# Patient Record
(demographics unavailable — no encounter records)

---

## 2025-06-18 NOTE — PLAN
[FreeTextEntry1] : Plan - MRI Brain - Schedule a telehealth appointment in the middle of the summer to assess the situation further and make decisions based on the MRI results and any changes in symptoms. - If symptoms worsen, contact our office to discuss the possibility of starting a vitamin treatment, which may be beneficial if migraines are suspected.

## 2025-06-18 NOTE — PHYSICAL EXAM
[Well-appearing] : well-appearing [Normocephalic] : normocephalic [No dysmorphic facial features] : no dysmorphic facial features [No ocular abnormalities] : no ocular abnormalities [Neck supple] : neck supple [Straight] : straight [No bhupinder or dimples] : no bhupinder or dimples [No deformities] : no deformities [Alert] : alert [Well related, good eye contact] : well related, good eye contact [Conversant] : conversant [Normal speech and language] : normal speech and language [Follows instructions well] : follows instructions well [VFF] : VFF [Pupils reactive to light and accommodation] : pupils reactive to light and accommodation [Full extraocular movements] : full extraocular movements [No nystagmus] : no nystagmus [No papilledema] : no papilledema [Normal facial sensation to light touch] : normal facial sensation to light touch [No facial asymmetry or weakness] : no facial asymmetry or weakness [Gross hearing intact] : gross hearing intact [Equal palate elevation] : equal palate elevation [Good shoulder shrug] : good shoulder shrug [Normal tongue movement] : normal tongue movement [Midline tongue, no fasciculations] : midline tongue, no fasciculations [R handed] : R handed [Normal axial and appendicular muscle tone] : normal axial and appendicular muscle tone [Gets up on table without difficulty] : gets up on table without difficulty [No pronator drift] : no pronator drift [Normal finger tapping and fine finger movements] : normal finger tapping and fine finger movements [No abnormal involuntary movements] : no abnormal involuntary movements [5/5 strength in proximal and distal muscles of arms and legs] : 5/5 strength in proximal and distal muscles of arms and legs [Walks and runs well] : walks and runs well [Able to do deep knee bend] : able to do deep knee bend [Able to walk on heels] : able to walk on heels [Able to walk on toes] : able to walk on toes [2+ biceps] : 2+ biceps [Triceps] : triceps [Knee jerks] : knee jerks [Ankle jerks] : ankle jerks [No ankle clonus] : no ankle clonus [Localizes LT and temperature] : localizes LT and temperature [No dysmetria on FTNT] : no dysmetria on FTNT [Good walking balance] : good walking balance [Normal gait] : normal gait [Able to tandem well] : able to tandem well [Negative Romberg] : negative Romberg
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
## 2025-06-18 NOTE — ASSESSMENT
[FreeTextEntry1] : 6 yo male with family history of migraines. Neurological examination is non focal, non lateralizing without signs of increased intracranial pressure. Which is reassuring at this time.  - Possible migraines, not definitively diagnosed due to lack of debilitating symptoms and typical migraine indicators. - Low suspicion of any dangerous condition or clinically important traumatic brain injury following the head injury a month ago. - Genetic predisposition to migraines considered, but not confirmed.   Plan - Order an MRI to rule out any dangerous conditions. Attempt the MRI without sedation initially, as the child appears calm and may not require sedation. - Schedule a telehealth appointment in the middle of the summer to assess the situation further and make decisions based on the MRI results and any changes in symptoms. - If symptoms worsen, contact the doctor to discuss the possibility of starting a vitamin treatment, which may be beneficial if migraines are suspected.

## 2025-06-18 NOTE — CONSULT LETTER
[Dear  ___] : Dear  [unfilled], [Consult Letter:] : I had the pleasure of evaluating your patient, [unfilled]. [Please see my note below.] : Please see my note below. [Sincerely,] : Sincerely, [FreeTextEntry3] : Keyla Todd MD Medical Director, Pediatric Concussion Program  , [employer] [facility] Department of Pediatric Neurology [facility]  [address], [address] Tel: [phone] Fax: [phone]